# Patient Record
Sex: FEMALE | Race: OTHER | NOT HISPANIC OR LATINO
[De-identification: names, ages, dates, MRNs, and addresses within clinical notes are randomized per-mention and may not be internally consistent; named-entity substitution may affect disease eponyms.]

---

## 2023-07-06 PROBLEM — Z00.00 ENCOUNTER FOR PREVENTIVE HEALTH EXAMINATION: Status: ACTIVE | Noted: 2023-07-06

## 2023-07-26 ENCOUNTER — APPOINTMENT (OUTPATIENT)
Dept: SURGERY | Facility: CLINIC | Age: 68
End: 2023-07-26
Payer: MEDICARE

## 2023-07-26 VITALS
HEIGHT: 66.5 IN | HEART RATE: 87 BPM | DIASTOLIC BLOOD PRESSURE: 92 MMHG | TEMPERATURE: 98.5 F | SYSTOLIC BLOOD PRESSURE: 157 MMHG | WEIGHT: 165 LBS | BODY MASS INDEX: 26.2 KG/M2

## 2023-07-26 DIAGNOSIS — Z87.39 PERSONAL HISTORY OF OTHER DISEASES OF THE MUSCULOSKELETAL SYSTEM AND CONNECTIVE TISSUE: ICD-10-CM

## 2023-07-26 DIAGNOSIS — T07.XXXA UNSPECIFIED MULTIPLE INJURIES, INITIAL ENCOUNTER: ICD-10-CM

## 2023-07-26 PROCEDURE — 99204 OFFICE O/P NEW MOD 45 MIN: CPT

## 2023-07-26 RX ORDER — ROSUVASTATIN CALCIUM 5 MG/1
5 TABLET, FILM COATED ORAL
Refills: 0 | Status: ACTIVE | COMMUNITY

## 2023-07-26 RX ORDER — CALCIUM CITRATE/VITAMIN D3 315MG-6.25
TABLET ORAL
Refills: 0 | Status: ACTIVE | COMMUNITY

## 2023-07-26 RX ORDER — MULTIVIT,IRON,MINERALS/LUTEIN
TABLET ORAL
Refills: 0 | Status: ACTIVE | COMMUNITY

## 2023-07-26 NOTE — PHYSICAL EXAM
[Respiratory Effort] : normal respiratory effort [Normal Rate and Rhythm] : normal rate and rhythm [No Rash or Lesion] : No rash or lesion [Alert] : alert [Calm] : calm [de-identified] : NAD, well-appearing [de-identified] : anicteric [de-identified] : large thyroid goiter with larger left half; bedside US showing heterogeneous multinodular goiter with extension substernally. SHe is speaking in a normal voice. [de-identified] : soft, nondistended

## 2023-07-26 NOTE — ASSESSMENT
[FreeTextEntry1] : 69 yo F with symptomatic MNG.\par \par We had a long discussion about thyroid anatomy, physiology and pathophysiology. We discussed the role of fine needle aspiration biopsies, their interpretation and management. I have recommended a total thyroidectomy. We discussed the benefits and potential risks of surgery including temporary and permanent hypoparathyroidism as well as temporary and permanent recurrent laryngeal nerve palsy, bleeding and infection. We discussed the use of intraoperative nerve monitoring. We also spoke about the need for lifelong thyroid replacement therapy postoperatively and temporary calcium supplementation postoperatively. We discussed the expected recovery.\par Surgery will be performed at [facility] under general anesthesia with nerve monitoring as an ambulatory or overnight procedure.\par The patient will require preoperative medical clearance.\par

## 2023-07-26 NOTE — HISTORY OF PRESENT ILLNESS
[de-identified] : 67 yo F with long-standing history of multiple thyroid nodules presents for discussion regarding surgery. She has been followed for many years and has undergone biopsies which have been benign. The nodules continue to grow and are causing symptoms of mass effect including pressure on the neck and feeling like someone is choking her. Her recent US showed MNG with dominant 5.2 cm left nodule. Her voice is unchanged. She has a family history of benign thyroid disease and no personal history of radiation exposure. Her thyroid function is normal. She was evaluated by a local ENT. She underwent a CT to evaluate for substernal component and this was ruled out.

## 2023-08-17 ENCOUNTER — APPOINTMENT (OUTPATIENT)
Dept: SURGERY | Facility: HOSPITAL | Age: 68
End: 2023-08-17

## 2023-08-17 ENCOUNTER — TRANSCRIPTION ENCOUNTER (OUTPATIENT)
Age: 68
End: 2023-08-17

## 2023-08-17 ENCOUNTER — RESULT REVIEW (OUTPATIENT)
Age: 68
End: 2023-08-17

## 2023-08-18 ENCOUNTER — RESULT REVIEW (OUTPATIENT)
Age: 68
End: 2023-08-18

## 2023-08-23 ENCOUNTER — NON-APPOINTMENT (OUTPATIENT)
Age: 68
End: 2023-08-23

## 2023-09-06 ENCOUNTER — LABORATORY RESULT (OUTPATIENT)
Age: 68
End: 2023-09-06

## 2023-09-06 ENCOUNTER — NON-APPOINTMENT (OUTPATIENT)
Age: 68
End: 2023-09-06

## 2023-09-06 ENCOUNTER — APPOINTMENT (OUTPATIENT)
Dept: SURGERY | Facility: CLINIC | Age: 68
End: 2023-09-06
Payer: MEDICARE

## 2023-09-06 VITALS
TEMPERATURE: 98.3 F | HEIGHT: 66.6 IN | BODY MASS INDEX: 26.2 KG/M2 | HEART RATE: 72 BPM | DIASTOLIC BLOOD PRESSURE: 85 MMHG | SYSTOLIC BLOOD PRESSURE: 143 MMHG | WEIGHT: 165 LBS

## 2023-09-06 DIAGNOSIS — C80.1 MALIGNANT (PRIMARY) NEOPLASM, UNSPECIFIED: ICD-10-CM

## 2023-09-06 DIAGNOSIS — E04.2 NONTOXIC MULTINODULAR GOITER: ICD-10-CM

## 2023-09-06 PROCEDURE — 99024 POSTOP FOLLOW-UP VISIT: CPT

## 2023-09-06 NOTE — ASSESSMENT
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  I have recommended that no additional surgery is needed at this time.  I will obtain baseline CEA and calcitonin today and this should be monitored by her endocrinologist Dr. Jean Stanford from [facility]. Will contact patient with results of bloodwork. She will see her local endocrinologist for TSH check in 4-6 more weeks as well.

## 2023-09-06 NOTE — HISTORY OF PRESENT ILLNESS
[de-identified] : Patient returns s/p total thyroidectomy. She is doing well without any complaints. She it tolerating the Synthroid well. She has no symptoms of hypocalcemia, dysphagia or painl. Her voice is normal.

## 2023-09-06 NOTE — DATA REVIEWED
[FreeTextEntry1] : Pathology reviewed in detail - incidental medullary microcarcinoma found, negative margins, no suspicious features and 2 negative LNs.

## 2023-09-06 NOTE — CONSULT LETTER
[Dear  ___] : Dear  [unfilled], [Courtesy Letter:] : I had the pleasure of seeing your patient, [unfilled], in my office today. [Consult Closing:] : Thank you very much for allowing me to participate in the care of this patient.  If you have any questions, please do not hesitate to contact me. [Sincerely,] : Sincerely, [FreeTextEntry1] : Enclosed please find my operative report, pathology report and postop visit note.  [FreeTextEntry3] : Delmy Casillas MD

## 2023-09-06 NOTE — PHYSICAL EXAM
[Respiratory Effort] : normal respiratory effort [Normal Rate and Rhythm] : normal rate and rhythm [No Rash or Lesion] : No rash or lesion [de-identified] : NAd, well-appearing [de-identified] : [id number] neck incision with minimal residual edema; incision c.d.i; normal voice

## 2023-09-08 LAB
CALCIT SERPL-MCNC: <1 PG/ML
CALCIUM SERPL-MCNC: 9.8 MG/DL
CALCIUM SERPL-MCNC: 9.8 MG/DL
CEA SERPL-MCNC: 0.8 NG/ML
PARATHYROID HORMONE INTACT: 84 PG/ML

## 2025-02-13 NOTE — DATA REVIEWED
In an effort to ensure that our patients [employer], a Team Member has reviewed your chart and identified an opportunity to provide the best care possible. An attempt was made to discuss or schedule due or overdue Preventive or Chronic Condition care.Care Gaps identified: Diabetes A1c Testing, Urine Testing, Eye Exam, and Foot Exam.    The Outcome was Contact was made, a Primary Care Visit and Follow-up Visit was scheduled.- appt scheduled for   3/17/2025 8:00 AM Ana Cole MD     Type of Appointment needed: Follow-up Visit    
[FreeTextEntry1] : Prior US and FNA reviewed\par \par US 5/14/23\par Right midpole nodule [id number]  TR4\par Right lower pole nodule [id number] TR 4\par Left upper pole nodule [id number], TR5\par left midpole nodule [id number] TR4\par